# Patient Record
Sex: FEMALE | Race: OTHER | Employment: OTHER | URBAN - METROPOLITAN AREA
[De-identification: names, ages, dates, MRNs, and addresses within clinical notes are randomized per-mention and may not be internally consistent; named-entity substitution may affect disease eponyms.]

---

## 2019-07-02 ENCOUNTER — APPOINTMENT (OUTPATIENT)
Dept: CT IMAGING | Age: 64
End: 2019-07-02
Attending: EMERGENCY MEDICINE
Payer: SUBSIDIZED

## 2019-07-02 ENCOUNTER — HOSPITAL ENCOUNTER (EMERGENCY)
Age: 64
Discharge: HOME | End: 2019-07-02
Attending: EMERGENCY MEDICINE
Payer: SUBSIDIZED

## 2019-07-02 ENCOUNTER — APPOINTMENT (OUTPATIENT)
Dept: GENERAL RADIOLOGY | Age: 64
End: 2019-07-02
Attending: EMERGENCY MEDICINE
Payer: SUBSIDIZED

## 2019-07-02 VITALS
HEART RATE: 80 BPM | HEIGHT: 64 IN | BODY MASS INDEX: 28.17 KG/M2 | WEIGHT: 165 LBS | OXYGEN SATURATION: 100 % | DIASTOLIC BLOOD PRESSURE: 58 MMHG | RESPIRATION RATE: 18 BRPM | TEMPERATURE: 98.5 F | SYSTOLIC BLOOD PRESSURE: 133 MMHG

## 2019-07-02 DIAGNOSIS — E87.6 HYPOKALEMIA: ICD-10-CM

## 2019-07-02 DIAGNOSIS — T40.711A: Primary | ICD-10-CM

## 2019-07-02 LAB
ALBUMIN SERPL-MCNC: 4.2 G/DL (ref 3.2–4.6)
ALBUMIN/GLOB SERPL: 1.1 {RATIO} (ref 1.2–3.5)
ALP SERPL-CCNC: 82 U/L (ref 50–130)
ALT SERPL-CCNC: 29 U/L (ref 12–65)
AMPHET UR QL SCN: NEGATIVE
ANION GAP SERPL CALC-SCNC: 13 MMOL/L (ref 7–16)
AST SERPL-CCNC: 16 U/L (ref 15–37)
ATRIAL RATE: 81 BPM
BACTERIA URNS QL MICRO: 0 /HPF
BARBITURATES UR QL SCN: NEGATIVE
BASOPHILS # BLD: 0.1 K/UL (ref 0–0.2)
BASOPHILS NFR BLD: 1 % (ref 0–2)
BENZODIAZ UR QL: NEGATIVE
BILIRUB SERPL-MCNC: 0.4 MG/DL (ref 0.2–1.1)
BUN SERPL-MCNC: 18 MG/DL (ref 8–23)
CALCIUM SERPL-MCNC: 9.2 MG/DL (ref 8.3–10.4)
CALCULATED P AXIS, ECG09: 57 DEGREES
CALCULATED R AXIS, ECG10: -47 DEGREES
CALCULATED T AXIS, ECG11: 51 DEGREES
CANNABINOIDS UR QL SCN: POSITIVE
CASTS URNS QL MICRO: 0 /LPF
CHLORIDE SERPL-SCNC: 101 MMOL/L (ref 98–107)
CO2 SERPL-SCNC: 25 MMOL/L (ref 21–32)
COCAINE UR QL SCN: NEGATIVE
CREAT SERPL-MCNC: 0.85 MG/DL (ref 0.6–1)
DIAGNOSIS, 93000: NORMAL
DIFFERENTIAL METHOD BLD: ABNORMAL
EOSINOPHIL # BLD: 0.1 K/UL (ref 0–0.8)
EOSINOPHIL NFR BLD: 2 % (ref 0.5–7.8)
EPI CELLS #/AREA URNS HPF: NORMAL /HPF
ERYTHROCYTE [DISTWIDTH] IN BLOOD BY AUTOMATED COUNT: 12 % (ref 11.9–14.6)
GLOBULIN SER CALC-MCNC: 3.7 G/DL (ref 2.3–3.5)
GLUCOSE SERPL-MCNC: 127 MG/DL (ref 65–100)
HCT VFR BLD AUTO: 39.6 % (ref 35.8–46.3)
HGB BLD-MCNC: 13.5 G/DL (ref 11.7–15.4)
IMM GRANULOCYTES # BLD AUTO: 0 K/UL (ref 0–0.5)
IMM GRANULOCYTES NFR BLD AUTO: 0 % (ref 0–5)
LYMPHOCYTES # BLD: 3.3 K/UL (ref 0.5–4.6)
LYMPHOCYTES NFR BLD: 48 % (ref 13–44)
MAGNESIUM SERPL-MCNC: 2.2 MG/DL (ref 1.8–2.4)
MCH RBC QN AUTO: 29.5 PG (ref 26.1–32.9)
MCHC RBC AUTO-ENTMCNC: 34.1 G/DL (ref 31.4–35)
MCV RBC AUTO: 86.7 FL (ref 79.6–97.8)
METHADONE UR QL: NEGATIVE
MONOCYTES # BLD: 0.5 K/UL (ref 0.1–1.3)
MONOCYTES NFR BLD: 7 % (ref 4–12)
NEUTS SEG # BLD: 2.9 K/UL (ref 1.7–8.2)
NEUTS SEG NFR BLD: 42 % (ref 43–78)
NRBC # BLD: 0 K/UL (ref 0–0.2)
OPIATES UR QL: NEGATIVE
P-R INTERVAL, ECG05: 140 MS
PCP UR QL: NEGATIVE
PLATELET # BLD AUTO: 268 K/UL (ref 150–450)
PMV BLD AUTO: 9.6 FL (ref 9.4–12.3)
POTASSIUM SERPL-SCNC: 2.8 MMOL/L (ref 3.5–5.1)
PROT SERPL-MCNC: 7.9 G/DL (ref 6.3–8.2)
Q-T INTERVAL, ECG07: 396 MS
QRS DURATION, ECG06: 100 MS
QTC CALCULATION (BEZET), ECG08: 460 MS
RBC # BLD AUTO: 4.57 M/UL (ref 4.05–5.2)
RBC #/AREA URNS HPF: NORMAL /HPF
SODIUM SERPL-SCNC: 139 MMOL/L (ref 136–145)
TROPONIN I SERPL-MCNC: <0.02 NG/ML (ref 0.02–0.05)
VENTRICULAR RATE, ECG03: 81 BPM
WBC # BLD AUTO: 6.9 K/UL (ref 4.3–11.1)
WBC URNS QL MICRO: NORMAL /HPF

## 2019-07-02 PROCEDURE — 70450 CT HEAD/BRAIN W/O DYE: CPT

## 2019-07-02 PROCEDURE — 93005 ELECTROCARDIOGRAM TRACING: CPT | Performed by: EMERGENCY MEDICINE

## 2019-07-02 PROCEDURE — 84484 ASSAY OF TROPONIN QUANT: CPT

## 2019-07-02 PROCEDURE — 74011250636 HC RX REV CODE- 250/636: Performed by: EMERGENCY MEDICINE

## 2019-07-02 PROCEDURE — 96360 HYDRATION IV INFUSION INIT: CPT | Performed by: EMERGENCY MEDICINE

## 2019-07-02 PROCEDURE — 81015 MICROSCOPIC EXAM OF URINE: CPT

## 2019-07-02 PROCEDURE — 85025 COMPLETE CBC W/AUTO DIFF WBC: CPT

## 2019-07-02 PROCEDURE — 80307 DRUG TEST PRSMV CHEM ANLYZR: CPT

## 2019-07-02 PROCEDURE — 74011250637 HC RX REV CODE- 250/637: Performed by: EMERGENCY MEDICINE

## 2019-07-02 PROCEDURE — 80053 COMPREHEN METABOLIC PANEL: CPT

## 2019-07-02 PROCEDURE — 99285 EMERGENCY DEPT VISIT HI MDM: CPT | Performed by: EMERGENCY MEDICINE

## 2019-07-02 PROCEDURE — 71045 X-RAY EXAM CHEST 1 VIEW: CPT

## 2019-07-02 PROCEDURE — 81003 URINALYSIS AUTO W/O SCOPE: CPT | Performed by: EMERGENCY MEDICINE

## 2019-07-02 PROCEDURE — 83735 ASSAY OF MAGNESIUM: CPT

## 2019-07-02 RX ORDER — POTASSIUM CHLORIDE 1.5 G/1.77G
20 POWDER, FOR SOLUTION ORAL
Status: COMPLETED | OUTPATIENT
Start: 2019-07-02 | End: 2019-07-02

## 2019-07-02 RX ORDER — POTASSIUM CHLORIDE 20 MEQ/1
20 TABLET, EXTENDED RELEASE ORAL DAILY
Qty: 5 TAB | Refills: 0 | Status: SHIPPED | OUTPATIENT
Start: 2019-07-02

## 2019-07-02 RX ADMIN — POTASSIUM CHLORIDE 20 MEQ: 1.5 POWDER, FOR SOLUTION ORAL at 20:20

## 2019-07-02 RX ADMIN — SODIUM CHLORIDE 1000 ML: 900 INJECTION, SOLUTION INTRAVENOUS at 20:46

## 2019-07-02 NOTE — PROGRESS NOTES
available from 4:30 p.m. - 1:00 a.m. Please call Dustin at [phone] with any interpreting requests. Thank you,      Flaco Dickerson, [address] [address] /  [email or web address] c: [phone] / [address] / [place], [address] [facility]  [email or web address]. [facility]

## 2019-07-02 NOTE — ED TRIAGE NOTES
Patient co chest pain that started 3 hours ago patient denies any sob. Patient anxious during triage adv to slow breathing.   Patient denies any cardiac hx

## 2019-07-02 NOTE — ED NOTES
Daughter approached this RN and requested drug screen, per daughter patient ate a brownie and not sure what was in it

## 2019-07-03 NOTE — ED NOTES
I have reviewed discharge instructions with the patient and spouse. The patient verbalized understanding. Patient left ED via Discharge Method: ambulatory to Home with family). Opportunity for questions and clarification provided. Patient given 1 scripts. To continue your aftercare when you leave the hospital, you may receive an automated call from our care team to check in on how you are doing. This is a free service and part of our promise to provide the best care and service to meet your aftercare needs.  If you have questions, or wish to unsubscribe from this service please call [phone]. Thank you for Choosing our [employer] [facility] Emergency Department.

## 2019-07-03 NOTE — ED PROVIDER NOTES
78-year-old female w/ no pertinent past medical history presents with complaint of dizziness, left-sided chest pain without radiation that started this afternoon after she consumed a brownie at a person's house and she was cleaning. Patient is uncertain if there was any marijuana in the brownie. Denies shortness of breath, focal weakness, numbness, tingling, cough, nausea, vomiting. Patient denies \"room spinning sensation\". Denies difficulty walking. The history is provided by the patient. The history is limited by a language barrier. A  was used (offered official . Family and patient denies. Family acted as ). Chest Pain (Angina)    This is a new problem. The current episode started 1 to 2 hours ago. The problem has been resolved. The problem occurs constantly. The pain is associated with rest. The pain is present in the left side and substernal region. The pain is at a severity of 1/10. The pain is mild. The quality of the pain is described as sharp. The pain does not radiate. Associated symptoms include dizziness. Pertinent negatives include no abdominal pain, no back pain, no claudication, no cough, no diaphoresis, no exertional chest pressure, no fever, no headaches, no hemoptysis, no irregular heartbeat, no leg pain, no lower extremity edema, no malaise/fatigue, no nausea, no near-syncope, no numbness, no orthopnea, no palpitations, no PND, no shortness of breath, no sputum production, no vomiting and no weakness. History reviewed. No pertinent past medical history. History reviewed. No pertinent surgical history. History reviewed. No pertinent family history.     Social History     Socioeconomic History    Marital status: UNKNOWN     Spouse name: Not on file    Number of children: Not on file    Years of education: Not on file    Highest education level: Not on file   Occupational History    Not on file   Social Needs    Financial resource strain: Not on file    Food insecurity:     Worry: Not on file     Inability: Not on file    Transportation needs:     Medical: Not on file     Non-medical: Not on file   Tobacco Use    Smoking status: Never Smoker    Smokeless tobacco: Never Used   Substance and Sexual Activity    Alcohol use: Never     Frequency: Never    Drug use: Never    Sexual activity: Not on file   Lifestyle    Physical activity:     Days per week: Not on file     Minutes per session: Not on file    Stress: Not on file   Relationships    Social connections:     Talks on phone: Not on file     Gets together: Not on file     Attends Hindu service: Not on file     Active member of club or organization: Not on file     Attends meetings of clubs or organizations: Not on file     Relationship status: Not on file    Intimate partner violence:     Fear of current or ex partner: Not on file     Emotionally abused: Not on file     Physically abused: Not on file     Forced sexual activity: Not on file   Other Topics Concern    Not on file   Social History Narrative    Not on file         ALLERGIES: Patient has no known allergies. Review of Systems   Constitutional: Negative for diaphoresis, fever and malaise/fatigue. HENT: Negative for congestion, sore throat and trouble swallowing. Respiratory: Negative for cough, hemoptysis, sputum production and shortness of breath. Cardiovascular: Positive for chest pain. Negative for palpitations, orthopnea, claudication, PND and near-syncope. Gastrointestinal: Negative for abdominal pain, constipation, diarrhea, nausea and vomiting. Genitourinary: Negative for dysuria and flank pain. Musculoskeletal: Negative for back pain, myalgias, neck pain and neck stiffness. Skin: Negative for rash and wound. Neurological: Positive for dizziness. Negative for tremors, seizures, speech difficulty, weakness, numbness and headaches.        Vitals:    07/02/19 1822   BP: 153/85 Pulse: 83   Resp: 16   Temp: 98 °F (36.7 °C)   SpO2: 98%   Weight: 74.8 kg (165 lb)   Height: 5' 4\" (1.626 m)            Physical Exam   Constitutional: She is oriented to person, place, and time. She appears well-developed and well-nourished. Well appearing and in NAD. HENT:   Head: Normocephalic. Mouth/Throat: Mucous membranes are normal.   MMM. No tonsillar erythema or exudate. Eyes: Pupils are equal, round, and reactive to light. Conjunctivae and EOM are normal.   No nystagmus. Neck: No JVD present. No tracheal deviation present. Cardiovascular: Normal rate, regular rhythm and normal heart sounds. RRR. Pulses 2+ and equal bilaterally. Pulmonary/Chest: Effort normal and breath sounds normal.   CTAB. No wheezes, rhonchi, or rales. Abdominal: Soft. Bowel sounds are normal.   Soft, NTND. No rebound or guarding. No CVAT. Musculoskeletal: Normal range of motion. No LE edema. No calf TTP. Neurological: She is alert and oriented to person, place, and time. No cranial nerve deficit. Strength 5/5 throughout. Normal sensory. No drift. No facial droop. No dysarthria. Skin: Skin is warm and dry. No rash. Nursing note and vitals reviewed. MDM  Number of Diagnoses or Management Options  Accidental marijuana poisoning, initial encounter: new and requires workup  Hypokalemia: new and requires workup  Diagnosis management comments: CT head negative. Chest negative. EKG with normal sinus rhythm. Troponin negative. Potassium 2.8. Repleted with oral replacement. UDS positive for THC. Therefore patient's story of ingesting brownie with marijuana in  to her symptoms. Patient at baseline mental status. Patient tolerating po. Patient with normal neuro exam.  Will discharge home with instructions to follow up PCP in 24-48 hours. Given strict return because her family at bedside.        Amount and/or Complexity of Data Reviewed  Clinical lab tests: ordered and reviewed  Tests in the radiology section of CPT®: ordered and reviewed  Tests in the medicine section of CPT®: ordered and reviewed  Review and summarize past medical records: yes  Independent visualization of images, tracings, or specimens: yes    Risk of Complications, Morbidity, and/or Mortality  Presenting problems: moderate  Diagnostic procedures: moderate    Patient Progress  Patient progress: stable    ED Course as of Jul 02 2101   Tue Jul 02, 2019 2017 CXR IMPRESSION: Normal single view chest x-ray. [DF]   2059 CT head IMPRESSION: Normal head CT.    [DF]      ED Course User Index  [DF] Pretty Cortes MD       EKG  Date/Time: 7/2/2019 8:31 PM  Performed by: Pretty Cortes MD  Authorized by:  Pretty Cortes MD     ECG reviewed by ED Physician in the absence of a cardiologist: yes    Rate:     ECG rate:  81    ECG rate assessment: normal    Rhythm:     Rhythm: sinus rhythm    Ectopy:     Ectopy: none    QRS:     QRS axis:  Normal    QRS intervals:  Normal  Conduction:     Conduction: abnormal      Abnormal conduction: LAFB    ST segments:     ST segments:  Normal  T waves:     T waves: normal          Results Include:    Recent Results (from the past 24 hour(s))   EKG, 12 LEAD, INITIAL    Collection Time: 07/02/19  6:14 PM   Result Value Ref Range    Ventricular Rate 81 BPM    Atrial Rate 81 BPM    P-R Interval 140 ms    QRS Duration 100 ms    Q-T Interval 396 ms    QTC Calculation (Bezet) 460 ms    Calculated P Axis 57 degrees    Calculated R Axis -47 degrees    Calculated T Axis 51 degrees    Diagnosis       Normal sinus rhythm  Left anterior fascicular block  Cannot rule out Anterior infarct , age undetermined  Abnormal ECG  No previous ECGs available     CBC WITH AUTOMATED DIFF    Collection Time: 07/02/19  6:21 PM   Result Value Ref Range    WBC 6.9 4.3 - 11.1 K/uL    RBC 4.57 4.05 - 5.2 M/uL    HGB 13.5 11.7 - 15.4 g/dL    HCT 39.6 35.8 - 46.3 %    MCV 86.7 79.6 - 97.8 FL    MCH 29.5 26.1 - 32.9 PG    MCHC 34.1 31.4 - 35.0 g/dL    RDW 12.0 11.9 - 14.6 %    PLATELET 830 250 - 109 K/uL    MPV 9.6 9.4 - 12.3 FL    ABSOLUTE NRBC 0.00 0.0 - 0.2 K/uL    DF AUTOMATED      NEUTROPHILS 42 (L) 43 - 78 %    LYMPHOCYTES 48 (H) 13 - 44 %    MONOCYTES 7 4.0 - 12.0 %    EOSINOPHILS 2 0.5 - 7.8 %    BASOPHILS 1 0.0 - 2.0 %    IMMATURE GRANULOCYTES 0 0.0 - 5.0 %    ABS. NEUTROPHILS 2.9 1.7 - 8.2 K/UL    ABS. LYMPHOCYTES 3.3 0.5 - 4.6 K/UL    ABS. MONOCYTES 0.5 0.1 - 1.3 K/UL    ABS. EOSINOPHILS 0.1 0.0 - 0.8 K/UL    ABS. BASOPHILS 0.1 0.0 - 0.2 K/UL    ABS. IMM. GRANS. 0.0 0.0 - 0.5 K/UL   METABOLIC PANEL, COMPREHENSIVE    Collection Time: 07/02/19  6:21 PM   Result Value Ref Range    Sodium 139 136 - 145 mmol/L    Potassium 2.8 (LL) 3.5 - 5.1 mmol/L    Chloride 101 98 - 107 mmol/L    CO2 25 21 - 32 mmol/L    Anion gap 13 7 - 16 mmol/L    Glucose 127 (H) 65 - 100 mg/dL    BUN 18 8 - 23 MG/DL    Creatinine 0.85 0.6 - 1.0 MG/DL    GFR est AA >60 >60 ml/min/1.73m2    GFR est non-AA >60 >60 ml/min/1.73m2    Calcium 9.2 8.3 - 10.4 MG/DL    Bilirubin, total 0.4 0.2 - 1.1 MG/DL    ALT (SGPT) 29 12 - 65 U/L    AST (SGOT) 16 15 - 37 U/L    Alk.  phosphatase 82 50 - 130 U/L    Protein, total 7.9 6.3 - 8.2 g/dL    Albumin 4.2 3.2 - 4.6 g/dL    Globulin 3.7 (H) 2.3 - 3.5 g/dL    A-G Ratio 1.1 (L) 1.2 - 3.5     TROPONIN I    Collection Time: 07/02/19  6:21 PM   Result Value Ref Range    Troponin-I, Qt. <0.02 (L) 0.02 - 0.05 NG/ML   MAGNESIUM    Collection Time: 07/02/19  6:21 PM   Result Value Ref Range    Magnesium 2.2 1.8 - 2.4 mg/dL   DRUG SCREEN, URINE    Collection Time: 07/02/19  8:14 PM   Result Value Ref Range    PCP(PHENCYCLIDINE) NEGATIVE       BENZODIAZEPINES NEGATIVE       COCAINE NEGATIVE       AMPHETAMINES NEGATIVE       METHADONE NEGATIVE       THC (TH-CANNABINOL) POSITIVE      OPIATES NEGATIVE       BARBITURATES NEGATIVE      URINE MICROSCOPIC    Collection Time: 07/02/19  8:14 PM   Result Value Ref Range WBC 0-3 0 /hpf    RBC 0-3 0 /hpf    Epithelial cells 0-3 0 /hpf    Bacteria 0 0 /hpf    Casts 0 0 /lpf     Jose Ramon Linda MD; 7/2/2019 @9:03 PM Voice dictation software was used during the making of this note. This software is not perfect and grammatical and other typographical errors may be present.   This note has not been proofread for errors.  ===================================================================

## 2019-07-03 NOTE — DISCHARGE INSTRUCTIONS
Follow-up with PCP in 24-48 hours. Return to ER if symptoms worsen or progress in any way. Hipocalemia: Instrucciones de cuidado - [ Hypokalemia: Care Instructions ]  Instrucciones de cuidado    La hipocalemia es un nivel bajo de potasio. El corazón, los músculos, los riñones y el sistema nervioso necesitan potasio para funcionar correctamente. Laisha problema tiene muchas [employer]. Los problemas renales, la alimentación y los medicamentos boston diuréticos y laxantes pueden causarla. [address] En algunos casos, el cáncer es la causa. [place] médico puede hacerle pruebas para determinar la causa de naif niveles bajos de [place]. Es posible que necesite medicamentos para hacer que naif niveles de potasio vuelvan a la normalidad. También podría tener que hacerse análisis de samson con regularidad para revisar el potasio. Si tiene un nivel de potasio muy bajo, podría necesitar medicamentos por vía intravenosa (IV). También podría necesitar pruebas para revisar la actividad eléctrica de smalls corazón. Los problemas del corazón causados por los bajos niveles de potasio pueden ser [employer]. La atención de seguimiento es keke parte clave de smalls tratamiento y seguridad. Asegúrese de hacer y acudir a todas las citas, y llame a smalls médico si está teniendo problemas. También es keke buena idea saber los resultados de naif exámenes y mantener keke lista de los medicamentos que mariajose. ¿Cómo puede cuidarse en el hogar? · Si el médico lo recomienda, consuma alimentos que tengan [employer]. Estos incluyen frutas frescas, jugos y verduras. [address], los frijoles (habichuelas) y [address]. · [place] con los medicamentos. Si smalls médico le receta medicamentos o suplementos de potasio, tómelos según las indicaciones. Llame a smalls médico si tiene algún problema con los medicamentos.   · Hágase análisis para revisar naif niveles de potasio con la frecuencia que le indique el médico.  ¿Cuándo debe pedir ayuda? Llame al 911 en cualquier momento que considere que necesita atención de [place]. Por ejemplo, llame si:    · Siente que smalls corazón omite latidos. Los problemas del corazón causados por los bajos niveles de potasio pueden provocar la muerte.     · Se desmayó (perdió el conocimiento).     · Tiene un episodio de convulsiones.    Llame a smalls médico ahora mismo o busque atención médica inmediata si:    · Se siente débil o inusualmente cansado.     · Tiene renata calambres en los brazos o las piernas.     · Tiene hormigueo o entumecimiento.     · Siente el estómago revuelto, o vomita.     · Tiene retortijones abdominales.     · Está abotagado o estreñido.     · Necesita orinar con mucha frecuencia.     · Tiene mucha sed la mayor parte del tiempo.     · Siente mareos o aturdimiento, o que está a punto de desmayarse.     · Se siente deprimido o pierde el contacto con la realidad.    Preste especial atención a los cambios en smalls doroteo y asegúrese de comunicarse con smalls médico si:    · No mejora boston se esperaba. ¿Dónde puede encontrar más información en inglés? Paulie Nelson a http://faustino-jud.info/. Conrad Line M937 en la búsqueda para aprender más acerca de \"Hipocalemia: Instrucciones de cuidado - [ Hypokalemia: Care Instructions ]. \"  Revisado: 14 marzo, 2018  Versión del contenido: 11.9  © 0514-3608 [employer]. Las instrucciones de cuidado fueron adaptadas bajo licencia por Good Help Connections (which disclaims liability or warranty for this information). Si usted tiene [place] afección médica o sobre estas instrucciones, siempre pregunte a smalls profesional de doroteo. [facility], Incorporated niega toda garantía o responsabilidad por smalls uso de esta información.          Patient Education        Sobredosis accidental de medicamentos: Instrucciones de cuidado - [ Accidental Overdose of Medicine: Care Instructions ]  Instrucciones de cuidado    Prácticamente cualquier medicamento puede causar daño si se mariajose en demasiada cantidad. Usted ha recibido tratamiento para ayudar a smalls organismo a deshacerse de keke sobredosis de un medicamento. [facility] roberto sido un medicamento de [place]. O podría roberto sido daniel que le recetó un médico. Hasta puede roberto sido keke vitamina o un suplemento. [place], el médico puede haberle dado líquidos y [place]. También pueden haberle hecho pruebas de laboratorio. Luego, el médico se aseguró de que usted estaba lo suficientemente kimber boston para regresar a smalls casa. El médico lo son examinado minuciosamente, skye pueden presentarse problemas más tarde. Si nota algún problema o nuevos síntomas, busque tratamiento médico de inmediato. La atención de seguimiento es keke parte clave de smalls tratamiento y seguridad. Asegúrese de hacer y acudir a todas las citas, y llame a smalls médico si está teniendo problemas. También es keke buena idea saber los resultados de naif exámenes y mantener keke lista de los medicamentos que mariajose. ¿Cómo puede cuidarse en el hogar? Cuidados en el hogar  · [employer] líquido. Si tiene keke enfermedad renal, cardíaca o hepática y tiene que restringir los líquidos, hable con smalls médico antes de aumentar la cantidad de líquido que owen. · Si normalmente mariajose medicamentos, pregúntele a smalls médico cuándo puede comenzar a tomarlos nuevamente. · Maritza la información que viene con todos los medicamentos. Si tiene preguntas, consulte con smalls médico o farmacéutico.  Prevención  · [place] con los medicamentos. [place] naif medicamentos exactamente boston se los recetaron o boston lo indique la [place]. Llame a smalls médico si kim que está teniendo un problema con smalls medicamento. · Mantenga los medicamentos en el envase en que vinieron con la etiqueta original.  · [place] qué hacer si omite keke dosis de smalls medicamento. ¿Cuándo debe pedir ayuda?   Llame al 911 en cualquier momento que considere que necesita atención de [place]. Por ejemplo, llame si:    · Se desmayó (perdió el conocimiento).     · Tiene dificultad para respirar.     · Está somnoliento o es difícil despertarlo.    Llame a smalls médico ahora mismo o busque atención médica inmediata si:    · [place].     · Tiene dolor de kurt nuevo o peor.     · Está mareado o aturdido o siente boston si fuera a desmayarse.    Preste especial atención a los cambios en smalls doroteo y asegúrese de comunicarse con smalls médico si:    · No mejora boston se esperaba. ¿Dónde puede encontrar más información en inglés? Andreina Kin a http://faustino-jud.info/. Elvia Espinal C165 en la búsqueda para aprender más acerca de \"Sobredosis accidental de medicamentos: Instrucciones de cuidado - [ Accidental Overdose of Medicine: Care Instructions ]. \"  Revisado: [address], 2018  Versión del contenido: 11.9  © 9815-2884 Healthwise, Incorporated. Las instrucciones de cuidado fueron adaptadas bajo licencia por Good Help Connections (which disclaims liability or warranty for this information). Si usted tiene [place] afección médica o sobre estas instrucciones, siempre pregunte a smalls profesional de doroteo. [employer] niega toda garantía o responsabilidad por smalls uso de esta información. Patient Education        Ingestión de alcohol, drogas o sustancias tóxicas: Instrucciones de cuidado - [ Alcohol, Drug, or Poison Ingestion: Care Instructions ]  Instrucciones de cuidado    [place] persona puede enfermarse gravemente, o morir, por tragar o consumir alcohol, drogas o sustancias tóxicas. La intoxicación etílica ocurre cuando keke persona owen keke gran cantidad de alcohol. El alcohol puede detener las señales nerviosas que controlan la respiración. También puede detener el reflejo faríngeo que chema la asfixia. La intoxicación etílica es grave. Puede provocar daño cerebral o la muerte si no se trata de inmediato.   Las drogas pueden usarse en forma accidental o a propósito. Pueden ser ingeridas, inhaladas, inyectadas o absorbidas a través de la piel. Las drogas incluyen medicamentos de venta melissa (boston aspirina o acetaminofén) y medicamentos recetados. Vonna Spenser vitaminas y suplementos además de drogas ilegales boston la cocaína y la heroína. Las sustancias tóxicas están en todos lados. Incluyen artículos de limpieza para el hogar, cosméticos, plantas de interior y productos químicos para el [place]. El médico lo son examinado minuciosamente, skye pueden presentarse problemas más tarde. Si nota algún problema o nuevos síntomas, busque tratamiento médico de inmediato. La atención de seguimiento es keke parte clave de smalls tratamiento y seguridad. Asegúrese de hacer y acudir a todas las citas, y llame a smalls médico si está teniendo problemas. También es keke buena idea saber los resultados de naif exámenes y mantener keke lista de los medicamentos que mariajose. ¿Cómo puede cuidarse en el hogar? Problemas con el alcohol  · Hable con smalls médico o consejero acerca de programas que puedan ayudarle a dejar de consumir alcohol. · Planifique maneras de evitar la tentación de beber. ? Deshágase de todo el alcohol que tenga en casa. ? Evite los lugares donde julia a beber. ? Aléjese de lugares o eventos en los que se ofrezca alcohol. ? Manténgase alejado de personas que [employer]. Problemas con drogas  · Hable con smalls médico acerca de programas que puedan ayudarle a dejar de consumir drogas. · Deshágase de cualquier droga que usted pueda sentir la tentación de consumir. · Aprenda a decir que no cuando otras personas consuman drogas. · No pase tiempo con personas que [employer]. Cómo prevenir la intoxicación  · Mantenga los productos en smalls envase original con la etiqueta original.  · Tenga cuidado al usar productos de limpieza, pinturas, disolventes y pesticidas. Maritza las etiquetas antes de usarlos.  Use un ventilador para extraer de smalls casa los olores y [employer] renata. · No mezcle productos de limpieza. Trate de usar productos de limpieza que no kimberlyn tóxicos. Estos incluyen vinagre, jugo de ceci y bicarbonato de [place]. ¿Cuándo debe pedir ayuda? Los centros de toxicología, los hospitales o smalls médico pueden darle consejos de inmediato en mayra de intoxicación. El número [address] [facility] de los [place] es [phone]. Tenga a mano el recipiente de la sustancia tóxica para que pueda francesco información completa al centro de toxicología, por ejemplo, de qué sustancia se trata, qué cantidad se ingirió y cuándo. No trate de hacer vomitar a la persona.   Llame al 911 en cualquier momento que considere que necesita atención de [place]. Por ejemplo, llame si usted u otra persona:    · Gerlene Pierre consumido o consume alcohol o drogas en la actualidad y está muy confuso o no puede permanecer despierto.     · Se ha desmayado (perdió el conocimiento).     · Tiene grave dificultad para respirar.     · Tiene un episodio de convulsiones.    Llame a smalls médico ahora o busque atención médica inmediata si usted u otra persona:    · Tiene síntomas nuevos o no se comporta con normalidad.    Preste especial atención a los cambios en smalls doroteo y asegúrese de comunicarse con smalls médico si:    · No mejora boston se esperaba.     · Necesita ayuda para naif problemas con las drogas o el alcohol.     · Tiene problemas de depresión u otras cuestiones de doroteo mental.   ¿Dónde puede encontrar más información en inglés? Aundra Ale a http://faustino-jud.info/. Baljit WOODWARD en la búsqueda para aprender más acerca de \"Ingestión de alcohol, drogas o sustancias tóxicas: Instrucciones de cuidado - [ Alcohol, Drug, or Poison Ingestion: Care Instructions ]. \"  Revisado: 23 septiembre, 2018  Versión del contenido: 11.9  © 9490-5957 [employer], [employer].  Las instrucciones de cuidado fueron adaptadas bajo licencia por [facility] Connections (which disclaims liability or warranty for this information). Si usted tiene [place] afección médica o sobre estas instrucciones, siempre pregunte a smalls profesional de doroteo. [facility], Incorporated niega toda garantía o responsabilidad por smalls uso de esta información.

## 2023-08-17 ENCOUNTER — OFFICE VISIT (OUTPATIENT)
Dept: INTERNAL MEDICINE CLINIC | Facility: CLINIC | Age: 68
End: 2023-08-17
Payer: MEDICARE

## 2023-08-17 VITALS
WEIGHT: 147.2 LBS | BODY MASS INDEX: 25.13 KG/M2 | DIASTOLIC BLOOD PRESSURE: 98 MMHG | HEART RATE: 66 BPM | SYSTOLIC BLOOD PRESSURE: 132 MMHG | OXYGEN SATURATION: 98 % | HEIGHT: 64 IN

## 2023-08-17 DIAGNOSIS — Z11.9 SCREENING EXAMINATION FOR INFECTIOUS DISEASE: ICD-10-CM

## 2023-08-17 DIAGNOSIS — Z12.11 SCREEN FOR COLON CANCER: ICD-10-CM

## 2023-08-17 DIAGNOSIS — Z00.00 INITIAL MEDICARE ANNUAL WELLNESS VISIT: Primary | ICD-10-CM

## 2023-08-17 DIAGNOSIS — Z12.31 SCREENING MAMMOGRAM FOR BREAST CANCER: ICD-10-CM

## 2023-08-17 DIAGNOSIS — Z78.0 POSTMENOPAUSAL: ICD-10-CM

## 2023-08-17 DIAGNOSIS — I10 PRIMARY HYPERTENSION: ICD-10-CM

## 2023-08-17 PROCEDURE — 3075F SYST BP GE 130 - 139MM HG: CPT | Performed by: INTERNAL MEDICINE

## 2023-08-17 PROCEDURE — G8427 DOCREV CUR MEDS BY ELIG CLIN: HCPCS | Performed by: INTERNAL MEDICINE

## 2023-08-17 PROCEDURE — 99213 OFFICE O/P EST LOW 20 MIN: CPT | Performed by: INTERNAL MEDICINE

## 2023-08-17 PROCEDURE — 3080F DIAST BP >= 90 MM HG: CPT | Performed by: INTERNAL MEDICINE

## 2023-08-17 PROCEDURE — G8419 CALC BMI OUT NRM PARAM NOF/U: HCPCS | Performed by: INTERNAL MEDICINE

## 2023-08-17 PROCEDURE — 1036F TOBACCO NON-USER: CPT | Performed by: INTERNAL MEDICINE

## 2023-08-17 PROCEDURE — G0438 PPPS, INITIAL VISIT: HCPCS | Performed by: INTERNAL MEDICINE

## 2023-08-17 PROCEDURE — 3017F COLORECTAL CA SCREEN DOC REV: CPT | Performed by: INTERNAL MEDICINE

## 2023-08-17 PROCEDURE — 1123F ACP DISCUSS/DSCN MKR DOCD: CPT | Performed by: INTERNAL MEDICINE

## 2023-08-17 PROCEDURE — G8400 PT W/DXA NO RESULTS DOC: HCPCS | Performed by: INTERNAL MEDICINE

## 2023-08-17 PROCEDURE — 1090F PRES/ABSN URINE INCON ASSESS: CPT | Performed by: INTERNAL MEDICINE

## 2023-08-17 SDOH — ECONOMIC STABILITY: HOUSING INSECURITY
IN THE LAST 12 MONTHS, WAS THERE A TIME WHEN YOU DID NOT HAVE A STEADY PLACE TO SLEEP OR SLEPT IN A SHELTER (INCLUDING NOW)?: NO

## 2023-08-17 SDOH — ECONOMIC STABILITY: FOOD INSECURITY: WITHIN THE PAST 12 MONTHS, THE FOOD YOU BOUGHT JUST DIDN'T LAST AND YOU DIDN'T HAVE MONEY TO GET MORE.: SOMETIMES TRUE

## 2023-08-17 SDOH — ECONOMIC STABILITY: FOOD INSECURITY: WITHIN THE PAST 12 MONTHS, YOU WORRIED THAT YOUR FOOD WOULD RUN OUT BEFORE YOU GOT MONEY TO BUY MORE.: SOMETIMES TRUE

## 2023-08-17 SDOH — ECONOMIC STABILITY: INCOME INSECURITY: HOW HARD IS IT FOR YOU TO PAY FOR THE VERY BASICS LIKE FOOD, HOUSING, MEDICAL CARE, AND HEATING?: SOMEWHAT HARD

## 2023-08-17 ASSESSMENT — PATIENT HEALTH QUESTIONNAIRE - PHQ9
SUM OF ALL RESPONSES TO PHQ QUESTIONS 1-9: 0
2. FEELING DOWN, DEPRESSED OR HOPELESS: 0
1. LITTLE INTEREST OR PLEASURE IN DOING THINGS: 0
SUM OF ALL RESPONSES TO PHQ QUESTIONS 1-9: 0
SUM OF ALL RESPONSES TO PHQ9 QUESTIONS 1 & 2: 0
SUM OF ALL RESPONSES TO PHQ QUESTIONS 1-9: 0
SUM OF ALL RESPONSES TO PHQ QUESTIONS 1-9: 0

## 2023-08-17 ASSESSMENT — LIFESTYLE VARIABLES
HOW MANY STANDARD DRINKS CONTAINING ALCOHOL DO YOU HAVE ON A TYPICAL DAY: PATIENT DOES NOT DRINK
HOW OFTEN DO YOU HAVE A DRINK CONTAINING ALCOHOL: NEVER

## 2023-08-17 NOTE — PROGRESS NOTES
This document was generated with the aid of voice recognition software. . Please be aware that there may be inadvertent transcription errors not identified and corrected by the authorMedicare Annual Wellness Visit    Autumn Rivera is here for [facility] and Medicare AW    Assessment & Plan   Initial Medicare annual wellness visit  -     CBC with Auto Differential; Future  -     Comprehensive Metabolic Panel; Future  -     Lipid Panel; Future  -     TSH with Reflex; Future  -     Hepatitis C Ab, Rflx to Qt by PCR; Future  -     HIV 1/2 Ag/Ab, 4TH Generation,W Rflx Confirm; Future  -     [address] [facility]  -     PAUL NOAH DIGITAL SCREEN BILATERAL; Future  -     DEXA BONE DENSITY AXIAL SKELETON; Future  Postmenopausal  -     DEXA BONE DENSITY AXIAL SKELETON; Future  Screen for colon cancer  -     [address] [facility]  Screening mammogram for breast cancer  -     PAUL NOAH DIGITAL SCREEN BILATERAL; Future  Primary hypertension  -     CBC with Auto Differential; Future  -     Comprehensive Metabolic Panel; Future  -     Lipid Panel; Future  -     TSH with Reflex; Future  -     Hepatitis C Ab, Rflx to Qt by PCR; Future  -     HIV 1/2 Ag/Ab, 4TH Generation,W Rflx Confirm; Future  Screening examination for infectious disease  -     Hepatitis C Ab, Rflx to Qt by PCR; Future  -     HIV 1/2 Ag/Ab, 4TH Generation,W Rflx Confirm; Future      Discussed Primary prevention aims to avert the development of disease including Immunizations, life style modifications (smoking cessation, promoting physical activity: 30 minutes x 3 week of moderate activity , diet changes ), Also discussed about Secondary prevention and in how it focuses on early detection and treatment of asymptomatic disease.  Screening for cancer, hearing ,dental or vision impairment, and how failure to do so may result in disease and even dead       Discussed elevated blood pressure

## 2023-08-25 DIAGNOSIS — I10 PRIMARY HYPERTENSION: ICD-10-CM

## 2023-08-25 DIAGNOSIS — Z11.9 SCREENING EXAMINATION FOR INFECTIOUS DISEASE: ICD-10-CM

## 2023-08-25 DIAGNOSIS — Z00.00 INITIAL MEDICARE ANNUAL WELLNESS VISIT: ICD-10-CM

## 2023-08-25 LAB
ALBUMIN SERPL-MCNC: 4.1 G/DL (ref 3.2–4.6)
ALBUMIN/GLOB SERPL: 1.3 (ref 0.4–1.6)
ALP SERPL-CCNC: 70 U/L (ref 50–136)
ALT SERPL-CCNC: 19 U/L (ref 12–65)
ANION GAP SERPL CALC-SCNC: 6 MMOL/L (ref 2–11)
AST SERPL-CCNC: 11 U/L (ref 15–37)
BASOPHILS # BLD: 0.1 K/UL (ref 0–0.2)
BASOPHILS NFR BLD: 1 % (ref 0–2)
BILIRUB SERPL-MCNC: 0.7 MG/DL (ref 0.2–1.1)
BUN SERPL-MCNC: 14 MG/DL (ref 8–23)
CALCIUM SERPL-MCNC: 9.2 MG/DL (ref 8.3–10.4)
CHLORIDE SERPL-SCNC: 110 MMOL/L (ref 101–110)
CHOLEST SERPL-MCNC: 221 MG/DL
CO2 SERPL-SCNC: 28 MMOL/L (ref 21–32)
CREAT SERPL-MCNC: 0.8 MG/DL (ref 0.6–1)
DIFFERENTIAL METHOD BLD: NORMAL
EOSINOPHIL # BLD: 0.1 K/UL (ref 0–0.8)
EOSINOPHIL NFR BLD: 2 % (ref 0.5–7.8)
ERYTHROCYTE [DISTWIDTH] IN BLOOD BY AUTOMATED COUNT: 12 % (ref 11.9–14.6)
GLOBULIN SER CALC-MCNC: 3.2 G/DL (ref 2.8–4.5)
GLUCOSE SERPL-MCNC: 95 MG/DL (ref 65–100)
HCT VFR BLD AUTO: 40.8 % (ref 35.8–46.3)
HDLC SERPL-MCNC: 53 MG/DL (ref 40–60)
HDLC SERPL: 4.2
HGB BLD-MCNC: 13.8 G/DL (ref 11.7–15.4)
HIV 1+2 AB+HIV1 P24 AG SERPL QL IA: NONREACTIVE
HIV 1/2 RESULT COMMENT: NORMAL
IMM GRANULOCYTES # BLD AUTO: 0 K/UL (ref 0–0.5)
IMM GRANULOCYTES NFR BLD AUTO: 0 % (ref 0–5)
LDLC SERPL CALC-MCNC: 142 MG/DL
LYMPHOCYTES # BLD: 1.7 K/UL (ref 0.5–4.6)
LYMPHOCYTES NFR BLD: 34 % (ref 13–44)
MCH RBC QN AUTO: 30.1 PG (ref 26.1–32.9)
MCHC RBC AUTO-ENTMCNC: 33.8 G/DL (ref 31.4–35)
MCV RBC AUTO: 89.1 FL (ref 82–102)
MONOCYTES # BLD: 0.3 K/UL (ref 0.1–1.3)
MONOCYTES NFR BLD: 7 % (ref 4–12)
NEUTS SEG # BLD: 2.8 K/UL (ref 1.7–8.2)
NEUTS SEG NFR BLD: 56 % (ref 43–78)
NRBC # BLD: 0 K/UL (ref 0–0.2)
PLATELET # BLD AUTO: 234 K/UL (ref 150–450)
PMV BLD AUTO: 10.3 FL (ref 9.4–12.3)
POTASSIUM SERPL-SCNC: 3.9 MMOL/L (ref 3.5–5.1)
PROT SERPL-MCNC: 7.3 G/DL (ref 6.3–8.2)
RBC # BLD AUTO: 4.58 M/UL (ref 4.05–5.2)
SODIUM SERPL-SCNC: 144 MMOL/L (ref 133–143)
TRIGL SERPL-MCNC: 130 MG/DL (ref 35–150)
TSH W FREE THYROID IF ABNORMAL: 1.27 UIU/ML (ref 0.36–3.74)
VLDLC SERPL CALC-MCNC: 26 MG/DL (ref 6–23)
WBC # BLD AUTO: 4.9 K/UL (ref 4.3–11.1)

## 2023-08-26 LAB
HCV AB SERPL QL IA: NORMAL
HCV IGG SERPL QL IA: NON REACTIVE S/CO RATIO

## 2023-09-15 ENCOUNTER — OFFICE VISIT (OUTPATIENT)
Dept: INTERNAL MEDICINE CLINIC | Facility: CLINIC | Age: 68
End: 2023-09-15
Payer: MEDICARE

## 2023-09-15 ENCOUNTER — HOSPITAL ENCOUNTER (OUTPATIENT)
Dept: MAMMOGRAPHY | Age: 68
End: 2023-09-15
Attending: INTERNAL MEDICINE
Payer: MEDICARE

## 2023-09-15 VITALS
WEIGHT: 146.4 LBS | OXYGEN SATURATION: 97 % | HEART RATE: 62 BPM | DIASTOLIC BLOOD PRESSURE: 82 MMHG | HEIGHT: 64 IN | SYSTOLIC BLOOD PRESSURE: 132 MMHG | BODY MASS INDEX: 25 KG/M2

## 2023-09-15 DIAGNOSIS — Z78.0 POSTMENOPAUSAL: ICD-10-CM

## 2023-09-15 DIAGNOSIS — Z00.00 INITIAL MEDICARE ANNUAL WELLNESS VISIT: ICD-10-CM

## 2023-09-15 DIAGNOSIS — E78.2 MIXED HYPERLIPIDEMIA: ICD-10-CM

## 2023-09-15 DIAGNOSIS — I10 PRIMARY HYPERTENSION: Primary | ICD-10-CM

## 2023-09-15 PROCEDURE — G8427 DOCREV CUR MEDS BY ELIG CLIN: HCPCS | Performed by: INTERNAL MEDICINE

## 2023-09-15 PROCEDURE — 99213 OFFICE O/P EST LOW 20 MIN: CPT | Performed by: INTERNAL MEDICINE

## 2023-09-15 PROCEDURE — G8399 PT W/DXA RESULTS DOCUMENT: HCPCS | Performed by: INTERNAL MEDICINE

## 2023-09-15 PROCEDURE — 3017F COLORECTAL CA SCREEN DOC REV: CPT | Performed by: INTERNAL MEDICINE

## 2023-09-15 PROCEDURE — 1036F TOBACCO NON-USER: CPT | Performed by: INTERNAL MEDICINE

## 2023-09-15 PROCEDURE — 1090F PRES/ABSN URINE INCON ASSESS: CPT | Performed by: INTERNAL MEDICINE

## 2023-09-15 PROCEDURE — 3079F DIAST BP 80-89 MM HG: CPT | Performed by: INTERNAL MEDICINE

## 2023-09-15 PROCEDURE — 77080 DXA BONE DENSITY AXIAL: CPT

## 2023-09-15 PROCEDURE — 1123F ACP DISCUSS/DSCN MKR DOCD: CPT | Performed by: INTERNAL MEDICINE

## 2023-09-15 PROCEDURE — G8419 CALC BMI OUT NRM PARAM NOF/U: HCPCS | Performed by: INTERNAL MEDICINE

## 2023-09-15 PROCEDURE — 3075F SYST BP GE 130 - 139MM HG: CPT | Performed by: INTERNAL MEDICINE

## 2023-09-15 ASSESSMENT — ENCOUNTER SYMPTOMS
ABDOMINAL DISTENTION: 0
ABDOMINAL PAIN: 0
CHEST TIGHTNESS: 0
SHORTNESS OF BREATH: 0
CONSTIPATION: 0
COUGH: 0

## 2023-09-15 NOTE — PROGRESS NOTES
Chief Complaint   Patient presents with    Follow-up     4 week f/u. Dulce Barrera is a 76 y.o. female who presents today for Follow-up (4 week f/u. )     She is here for follow-up after initial visit, since last visit she has completed bone density today, results are not yet available, mammogram has not been done  Blood work showing persistent hyperlipidemia,  Blood pressure today continue to be borderline elevated  She follows a vegetarian diet but believes her main deficit right now is consistency with physical activities and exercise    The 10-year ASCVD risk score (Pema ACUÑA, et al., 2019) is: 8.5%    Values used to calculate the score:      Age: 76 years      Sex: Female      Is Non- : No      Diabetic: No      Tobacco smoker: No      Systolic Blood Pressure: 489 mmHg      Is BP treated: No      HDL Cholesterol: 53 MG/DL      Total Cholesterol: 221 MG/DL      Wt Readings from Last 3 Encounters:   09/15/23 146 lb 6.4 oz (66.4 kg)   08/17/23 147 lb 3.2 oz (66.8 kg)     Vitals:    09/15/23 1154 09/15/23 1216   BP: (!) 140/90 132/82   Site: Left Upper Arm Left Upper Arm   Position: Sitting Sitting   Cuff Size:  Large Adult   Pulse: 62    SpO2: 97%    Weight: 146 lb 6.4 oz (66.4 kg)    Height: 5' 4\" (1.626 m)         Assessment and plan:  1. Primary hypertension  -     Lipid Panel; Future  -     Comprehensive Metabolic Panel; Future  2. Mixed hyperlipidemia  -     Lipid Panel; Future  -     Comprehensive Metabolic Panel; Future  After discussion today, she would like to hold taking any medications for hyperlipidemia, we discussed about current recommendations regards to statin therapy if they cardiovascular risk is more than 7%, she would like to try increasing her physical activity, at least 3-4 times a week, she is motivated to do so, and will continue with current vegetarian diet, we discussed about DASH diet, Mediterranean diet.   She will focus in the next 3 to 6

## 2024-03-15 ENCOUNTER — OFFICE VISIT (OUTPATIENT)
Dept: INTERNAL MEDICINE CLINIC | Facility: CLINIC | Age: 69
End: 2024-03-15
Payer: MEDICARE

## 2024-03-15 VITALS
BODY MASS INDEX: 24.72 KG/M2 | DIASTOLIC BLOOD PRESSURE: 80 MMHG | SYSTOLIC BLOOD PRESSURE: 136 MMHG | WEIGHT: 144.8 LBS | HEART RATE: 64 BPM | HEIGHT: 64 IN | OXYGEN SATURATION: 98 %

## 2024-03-15 DIAGNOSIS — M81.0 AGE-RELATED OSTEOPOROSIS WITHOUT CURRENT PATHOLOGICAL FRACTURE: ICD-10-CM

## 2024-03-15 DIAGNOSIS — Z12.11 SCREEN FOR COLON CANCER: ICD-10-CM

## 2024-03-15 DIAGNOSIS — Z12.31 SCREENING MAMMOGRAM FOR BREAST CANCER: ICD-10-CM

## 2024-03-15 DIAGNOSIS — I10 PRIMARY HYPERTENSION: Primary | ICD-10-CM

## 2024-03-15 DIAGNOSIS — E78.2 MIXED HYPERLIPIDEMIA: ICD-10-CM

## 2024-03-15 PROCEDURE — 3017F COLORECTAL CA SCREEN DOC REV: CPT | Performed by: INTERNAL MEDICINE

## 2024-03-15 PROCEDURE — G8484 FLU IMMUNIZE NO ADMIN: HCPCS | Performed by: INTERNAL MEDICINE

## 2024-03-15 PROCEDURE — 1090F PRES/ABSN URINE INCON ASSESS: CPT | Performed by: INTERNAL MEDICINE

## 2024-03-15 PROCEDURE — 1036F TOBACCO NON-USER: CPT | Performed by: INTERNAL MEDICINE

## 2024-03-15 PROCEDURE — G8427 DOCREV CUR MEDS BY ELIG CLIN: HCPCS | Performed by: INTERNAL MEDICINE

## 2024-03-15 PROCEDURE — G8420 CALC BMI NORM PARAMETERS: HCPCS | Performed by: INTERNAL MEDICINE

## 2024-03-15 PROCEDURE — 3079F DIAST BP 80-89 MM HG: CPT | Performed by: INTERNAL MEDICINE

## 2024-03-15 PROCEDURE — G8399 PT W/DXA RESULTS DOCUMENT: HCPCS | Performed by: INTERNAL MEDICINE

## 2024-03-15 PROCEDURE — 1123F ACP DISCUSS/DSCN MKR DOCD: CPT | Performed by: INTERNAL MEDICINE

## 2024-03-15 PROCEDURE — 3075F SYST BP GE 130 - 139MM HG: CPT | Performed by: INTERNAL MEDICINE

## 2024-03-15 PROCEDURE — 99214 OFFICE O/P EST MOD 30 MIN: CPT | Performed by: INTERNAL MEDICINE

## 2024-03-15 ASSESSMENT — PATIENT HEALTH QUESTIONNAIRE - PHQ9
SUM OF ALL RESPONSES TO PHQ9 QUESTIONS 1 & 2: 0
SUM OF ALL RESPONSES TO PHQ QUESTIONS 1-9: 0
1. LITTLE INTEREST OR PLEASURE IN DOING THINGS: 0
SUM OF ALL RESPONSES TO PHQ QUESTIONS 1-9: 0
2. FEELING DOWN, DEPRESSED OR HOPELESS: 0

## 2024-03-15 ASSESSMENT — ENCOUNTER SYMPTOMS
SHORTNESS OF BREATH: 0
ABDOMINAL DISTENTION: 0
CHEST TIGHTNESS: 0
COUGH: 0
CONSTIPATION: 0
ABDOMINAL PAIN: 0

## 2024-03-15 NOTE — PROGRESS NOTES
Value Date    TSHELE 1.27 08/25/2023         Lab Results   Component Value Date     (H) 08/25/2023    K 3.9 08/25/2023     08/25/2023    CO2 28 08/25/2023    BUN 14 08/25/2023    CREATININE 0.80 08/25/2023    GLUCOSE 95 08/25/2023    CALCIUM 9.2 08/25/2023    PROT 7.3 08/25/2023    LABALBU 4.1 08/25/2023    BILITOT 0.7 08/25/2023    ALKPHOS 70 08/25/2023    AST 11 (L) 08/25/2023    ALT 19 08/25/2023    LABGLOM >60 08/25/2023    GFRAA >60 07/02/2019    AGRATIO 1.3 08/25/2023    GLOB 3.2 08/25/2023       Lab Results   Component Value Date    WBC 4.9 08/25/2023    HGB 13.8 08/25/2023    HCT 40.8 08/25/2023    MCV 89.1 08/25/2023     08/25/2023             This document was generated with the aid of voice recognition software.. Please be aware that there may be inadvertent transcription errors not identified and corrected by the author

## 2024-04-29 LAB — NONINV COLON CA DNA+OCC BLD SCRN STL QL: NEGATIVE

## 2024-07-30 ENCOUNTER — HOSPITAL ENCOUNTER (OUTPATIENT)
Dept: MAMMOGRAPHY | Age: 69
Discharge: HOME | End: 2024-08-02
Attending: INTERNAL MEDICINE
Payer: MEDICARE

## 2024-07-30 VITALS — BODY MASS INDEX: 26.68 KG/M2 | WEIGHT: 145 LBS | HEIGHT: 62 IN

## 2024-07-30 DIAGNOSIS — Z12.31 SCREENING MAMMOGRAM FOR BREAST CANCER: ICD-10-CM

## 2024-07-30 PROCEDURE — 77063 BREAST TOMOSYNTHESIS BI: CPT

## 2025-01-28 DIAGNOSIS — M81.0 AGE-RELATED OSTEOPOROSIS WITHOUT CURRENT PATHOLOGICAL FRACTURE: ICD-10-CM

## 2025-01-28 DIAGNOSIS — E78.2 MIXED HYPERLIPIDEMIA: ICD-10-CM

## 2025-01-28 DIAGNOSIS — I10 PRIMARY HYPERTENSION: ICD-10-CM

## 2025-01-28 LAB
25(OH)D3 SERPL-MCNC: 35.7 NG/ML (ref 30–100)
ALBUMIN SERPL-MCNC: 4.1 G/DL (ref 3.2–4.6)
ALBUMIN/GLOB SERPL: 1.4 (ref 1–1.9)
ALP SERPL-CCNC: 71 U/L (ref 35–104)
ALT SERPL-CCNC: 19 U/L (ref 8–45)
ANION GAP SERPL CALC-SCNC: 10 MMOL/L (ref 7–16)
AST SERPL-CCNC: 18 U/L (ref 15–37)
BASOPHILS # BLD: 0.06 K/UL (ref 0–0.2)
BASOPHILS NFR BLD: 1.4 % (ref 0–2)
BILIRUB SERPL-MCNC: 0.6 MG/DL (ref 0–1.2)
BUN SERPL-MCNC: 16 MG/DL (ref 8–23)
CALCIUM SERPL-MCNC: 9.6 MG/DL (ref 8.8–10.2)
CHLORIDE SERPL-SCNC: 105 MMOL/L (ref 98–107)
CHOLEST SERPL-MCNC: 245 MG/DL (ref 0–200)
CO2 SERPL-SCNC: 28 MMOL/L (ref 20–29)
CREAT SERPL-MCNC: 0.78 MG/DL (ref 0.6–1.1)
DIFFERENTIAL METHOD BLD: ABNORMAL
EOSINOPHIL # BLD: 0.1 K/UL (ref 0–0.8)
EOSINOPHIL NFR BLD: 2.4 % (ref 0.5–7.8)
ERYTHROCYTE [DISTWIDTH] IN BLOOD BY AUTOMATED COUNT: 12 % (ref 11.9–14.6)
GLOBULIN SER CALC-MCNC: 2.9 G/DL (ref 2.3–3.5)
GLUCOSE SERPL-MCNC: 99 MG/DL (ref 70–99)
HCT VFR BLD AUTO: 41.4 % (ref 35.8–46.3)
HDLC SERPL-MCNC: 54 MG/DL (ref 40–60)
HDLC SERPL: 4.6 (ref 0–5)
HGB BLD-MCNC: 13.6 G/DL (ref 11.7–15.4)
IMM GRANULOCYTES # BLD AUTO: 0.01 K/UL (ref 0–0.5)
IMM GRANULOCYTES NFR BLD AUTO: 0.2 % (ref 0–5)
LDLC SERPL CALC-MCNC: 161 MG/DL (ref 0–100)
LYMPHOCYTES # BLD: 1.42 K/UL (ref 0.5–4.6)
LYMPHOCYTES NFR BLD: 33.6 % (ref 13–44)
MCH RBC QN AUTO: 29.6 PG (ref 26.1–32.9)
MCHC RBC AUTO-ENTMCNC: 32.9 G/DL (ref 31.4–35)
MCV RBC AUTO: 90.2 FL (ref 82–102)
MONOCYTES # BLD: 0.27 K/UL (ref 0.1–1.3)
MONOCYTES NFR BLD: 6.4 % (ref 4–12)
NEUTS SEG # BLD: 2.36 K/UL (ref 1.7–8.2)
NEUTS SEG NFR BLD: 56 % (ref 43–78)
NRBC # BLD: 0 K/UL (ref 0–0.2)
PLATELET # BLD AUTO: 249 K/UL (ref 150–450)
PMV BLD AUTO: 10.4 FL (ref 9.4–12.3)
POTASSIUM SERPL-SCNC: 4.1 MMOL/L (ref 3.5–5.1)
PROT SERPL-MCNC: 7 G/DL (ref 6.3–8.2)
RBC # BLD AUTO: 4.59 M/UL (ref 4.05–5.2)
SODIUM SERPL-SCNC: 142 MMOL/L (ref 136–145)
TRIGL SERPL-MCNC: 152 MG/DL (ref 0–150)
TSH W FREE THYROID IF ABNORMAL: 1.44 UIU/ML (ref 0.27–4.2)
VLDLC SERPL CALC-MCNC: 30 MG/DL (ref 6–23)
WBC # BLD AUTO: 4.2 K/UL (ref 4.3–11.1)

## 2025-02-04 ENCOUNTER — OFFICE VISIT (OUTPATIENT)
Dept: INTERNAL MEDICINE CLINIC | Facility: CLINIC | Age: 70
End: 2025-02-04

## 2025-02-04 VITALS
BODY MASS INDEX: 26.87 KG/M2 | HEIGHT: 62 IN | HEART RATE: 62 BPM | OXYGEN SATURATION: 98 % | DIASTOLIC BLOOD PRESSURE: 82 MMHG | WEIGHT: 146 LBS | SYSTOLIC BLOOD PRESSURE: 142 MMHG

## 2025-02-04 DIAGNOSIS — Z00.00 MEDICARE ANNUAL WELLNESS VISIT, SUBSEQUENT: Primary | ICD-10-CM

## 2025-02-04 DIAGNOSIS — M81.0 AGE-RELATED OSTEOPOROSIS WITHOUT CURRENT PATHOLOGICAL FRACTURE: ICD-10-CM

## 2025-02-04 DIAGNOSIS — E78.2 MIXED HYPERLIPIDEMIA: ICD-10-CM

## 2025-02-04 DIAGNOSIS — I10 PRIMARY HYPERTENSION: ICD-10-CM

## 2025-02-04 RX ORDER — PRAVASTATIN SODIUM 20 MG
20 TABLET ORAL DAILY
Qty: 90 TABLET | Refills: 3 | Status: SHIPPED | OUTPATIENT
Start: 2025-02-04

## 2025-02-04 SDOH — ECONOMIC STABILITY: FOOD INSECURITY: WITHIN THE PAST 12 MONTHS, THE FOOD YOU BOUGHT JUST DIDN'T LAST AND YOU DIDN'T HAVE MONEY TO GET MORE.: NEVER TRUE

## 2025-02-04 SDOH — ECONOMIC STABILITY: FOOD INSECURITY: WITHIN THE PAST 12 MONTHS, YOU WORRIED THAT YOUR FOOD WOULD RUN OUT BEFORE YOU GOT MONEY TO BUY MORE.: NEVER TRUE

## 2025-02-04 ASSESSMENT — PATIENT HEALTH QUESTIONNAIRE - PHQ9
1. LITTLE INTEREST OR PLEASURE IN DOING THINGS: NOT AT ALL
2. FEELING DOWN, DEPRESSED OR HOPELESS: NOT AT ALL
SUM OF ALL RESPONSES TO PHQ QUESTIONS 1-9: 0
SUM OF ALL RESPONSES TO PHQ9 QUESTIONS 1 & 2: 0
SUM OF ALL RESPONSES TO PHQ QUESTIONS 1-9: 0

## 2025-02-04 NOTE — PROGRESS NOTES
Medicare Annual Wellness Visit    Kathie King is here for Medicare AWV    Assessment & Plan   Medicare annual wellness visit, subsequent  Primary hypertension  -     Hemoglobin A1C; Future  -     CBC with Auto Differential; Future  -     Comprehensive Metabolic Panel; Future  -     Lipid Panel; Future  -     TSH reflex to FT4; Future  Mixed hyperlipidemia  -     pravastatin (PRAVACHOL) 20 MG tablet; Take 1 tablet by mouth daily, Disp-90 tablet, R-3Normal  -     Hemoglobin A1C; Future  -     CBC with Auto Differential; Future  -     Comprehensive Metabolic Panel; Future  -     Lipid Panel; Future  -     TSH reflex to FT4; Future  Age-related osteoporosis without current pathological fracture  -     Vitamin D 25 Hydroxy; Future       Results were discussed in detail with patient,  Patient is going to [facility] and is standing for few months,  She will have her CBC rechecked, as well as liver, kidney function test and cholesterol panel recheck    We discussed about her increased risk of cardiovascular disease, especially with borderline elevated blood pressure, LDL elevation, patient is agreeable to try low-dose of pravastatin but she would prefer not to be on medications, she also will have an appointment with nutritionist when [place]  But if she is try in 6 to 8 months, patient has been advised to make an appointment to recheck labs here,  She also was encouraged to check her mammogram later this year as well as bone density  And to continue with lifestyle modification  Return in about 1 year (around 2/4/2026) for HTN, AWV, hyperlipidemia (6-12 Months).     Subjective   The following acute and/or chronic problems were also addressed today:  Annual wellness visit, and follow-up in chronic conditions including hyperlipidemia and elevated blood pressure, she completed her labs, showing an increase in total cholesterol and LDL, she reports that he is trying to keep a balanced diet, she has

## 2025-02-04 NOTE — PATIENT INSTRUCTIONS
